# Patient Record
Sex: FEMALE | Race: WHITE | Employment: PART TIME | URBAN - METROPOLITAN AREA
[De-identification: names, ages, dates, MRNs, and addresses within clinical notes are randomized per-mention and may not be internally consistent; named-entity substitution may affect disease eponyms.]

---
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
## 2019-10-03 ENCOUNTER — HEALTH MAINTENANCE LETTER (OUTPATIENT)
Age: 19
End: 2019-10-03

## 2019-12-30 ENCOUNTER — TRANSFERRED RECORDS (OUTPATIENT)
Dept: FAMILY MEDICINE | Facility: CLINIC | Age: 19
End: 2019-12-30

## 2020-11-07 ENCOUNTER — HEALTH MAINTENANCE LETTER (OUTPATIENT)
Age: 20
End: 2020-11-07

## 2021-06-28 ENCOUNTER — TELEPHONE (OUTPATIENT)
Dept: FAMILY MEDICINE | Facility: CLINIC | Age: 21
End: 2021-06-28

## 2021-06-28 NOTE — TELEPHONE ENCOUNTER
Medical records request from [facility] [employer]. No records sent patient was last seen in  2018. Informed [employer] in the regards of records.

## 2021-07-13 ENCOUNTER — OFFICE VISIT (OUTPATIENT)
Dept: FAMILY MEDICINE | Facility: CLINIC | Age: 21
End: 2021-07-13

## 2021-07-13 VITALS
TEMPERATURE: 97.2 F | HEART RATE: 90 BPM | WEIGHT: 245 LBS | OXYGEN SATURATION: 99 % | HEIGHT: 65 IN | BODY MASS INDEX: 40.82 KG/M2 | DIASTOLIC BLOOD PRESSURE: 72 MMHG | SYSTOLIC BLOOD PRESSURE: 120 MMHG

## 2021-07-13 DIAGNOSIS — R63.0 DECREASE IN APPETITE: ICD-10-CM

## 2021-07-13 DIAGNOSIS — E66.01 MORBID OBESITY (H): ICD-10-CM

## 2021-07-13 DIAGNOSIS — F41.1 GENERALIZED ANXIETY DISORDER: ICD-10-CM

## 2021-07-13 DIAGNOSIS — F33.41 RECURRENT MAJOR DEPRESSIVE DISORDER, IN PARTIAL REMISSION (H): ICD-10-CM

## 2021-07-13 DIAGNOSIS — R11.2 NON-INTRACTABLE VOMITING WITH NAUSEA, UNSPECIFIED VOMITING TYPE: Primary | ICD-10-CM

## 2021-07-13 DIAGNOSIS — F60.3 BORDERLINE PERSONALITY DISORDER (H): ICD-10-CM

## 2021-07-13 DIAGNOSIS — Z86.59 HISTORY OF EATING DISORDER: ICD-10-CM

## 2021-07-13 LAB
ALBUMIN SERPL-MCNC: 4.8 G/DL (ref 3.6–5.1)
ALBUMIN/GLOB SERPL: 2.3 {RATIO} (ref 1–2.5)
ALP SERPL-CCNC: 73 U/L (ref 33–130)
ALT 1742-6: 9 U/L (ref 0–32)
AST 1920-8: 8 U/L (ref 0–35)
BILIRUB SERPL-MCNC: 0.4 MG/DL (ref 0.2–1.2)
BUN SERPL-MCNC: 8 MG/DL (ref 7–25)
BUN/CREATININE RATIO: 10.7 (ref 6–22)
CALCIUM SERPL-MCNC: 9.6 MG/DL (ref 8.6–10.3)
CHLORIDE SERPLBLD-SCNC: 106.5 MMOL/L (ref 98–110)
CO2 SERPL-SCNC: 25.8 MMOL/L (ref 20–32)
CREAT SERPL-MCNC: 0.75 MG/DL (ref 0.6–1.3)
ERYTHROCYTE [DISTWIDTH] IN BLOOD BY AUTOMATED COUNT: 12.8 %
GLOBULIN, CALCULATED - QUEST: 2.1 (ref 1.9–3.7)
GLUCOSE SERPL-MCNC: 100 MG/DL (ref 60–99)
HCT VFR BLD AUTO: 45.3 % (ref 35–47)
HEMOGLOBIN: 14.2 G/DL (ref 11.7–15.7)
MCH RBC QN AUTO: 29.3 PG (ref 26–33)
MCHC RBC AUTO-ENTMCNC: 31.3 G/DL (ref 31–36)
MCV RBC AUTO: 93.4 FL (ref 78–100)
PLATELET COUNT - QUEST: 344 10^9/L (ref 150–375)
POTASSIUM SERPL-SCNC: 4.61 MMOL/L (ref 3.5–5.3)
PROT SERPL-MCNC: 6.9 G/DL (ref 6.1–8.1)
RBC # BLD AUTO: 4.85 10*12/L (ref 3.8–5.2)
SODIUM SERPL-SCNC: 141 MMOL/L (ref 135–146)
WBC # BLD AUTO: 6.8 10*9/L (ref 4–11)

## 2021-07-13 PROCEDURE — 36415 COLL VENOUS BLD VENIPUNCTURE: CPT | Performed by: PHYSICIAN ASSISTANT

## 2021-07-13 PROCEDURE — 85027 COMPLETE CBC AUTOMATED: CPT | Performed by: PHYSICIAN ASSISTANT

## 2021-07-13 PROCEDURE — 80053 COMPREHEN METABOLIC PANEL: CPT | Performed by: PHYSICIAN ASSISTANT

## 2021-07-13 PROCEDURE — 99213 OFFICE O/P EST LOW 20 MIN: CPT | Performed by: PHYSICIAN ASSISTANT

## 2021-07-13 PROCEDURE — 84443 ASSAY THYROID STIM HORMONE: CPT | Mod: 90 | Performed by: PHYSICIAN ASSISTANT

## 2021-07-13 RX ORDER — VENLAFAXINE HYDROCHLORIDE 75 MG/1
75 CAPSULE, EXTENDED RELEASE ORAL DAILY
COMMUNITY
Start: 2021-06-10

## 2021-07-13 RX ORDER — VENLAFAXINE HYDROCHLORIDE 150 MG/1
150 CAPSULE, EXTENDED RELEASE ORAL DAILY
COMMUNITY
Start: 2021-07-09

## 2021-07-13 ASSESSMENT — ANXIETY QUESTIONNAIRES
5. BEING SO RESTLESS THAT IT IS HARD TO SIT STILL: SEVERAL DAYS
GAD7 TOTAL SCORE: 15
3. WORRYING TOO MUCH ABOUT DIFFERENT THINGS: MORE THAN HALF THE DAYS
IF YOU CHECKED OFF ANY PROBLEMS ON THIS QUESTIONNAIRE, HOW DIFFICULT HAVE THESE PROBLEMS MADE IT FOR YOU TO DO YOUR WORK, TAKE CARE OF THINGS AT HOME, OR GET ALONG WITH OTHER PEOPLE: VERY DIFFICULT
1. FEELING NERVOUS, ANXIOUS, OR ON EDGE: NEARLY EVERY DAY
2. NOT BEING ABLE TO STOP OR CONTROL WORRYING: MORE THAN HALF THE DAYS
7. FEELING AFRAID AS IF SOMETHING AWFUL MIGHT HAPPEN: SEVERAL DAYS
6. BECOMING EASILY ANNOYED OR IRRITABLE: NEARLY EVERY DAY

## 2021-07-13 ASSESSMENT — MIFFLIN-ST. JEOR: SCORE: 1872.19

## 2021-07-13 ASSESSMENT — PATIENT HEALTH QUESTIONNAIRE - PHQ9
5. POOR APPETITE OR OVEREATING: NEARLY EVERY DAY
SUM OF ALL RESPONSES TO PHQ QUESTIONS 1-9: 13

## 2021-07-13 NOTE — NURSING NOTE
Chief Complaint   Patient presents with     Nausea     nausea issues for 3-4 months and worse the past few weeks. Irratibilty recently due to work changes. Wants to check for diabetes.       Pre-visit Screening:  Immunizations:  up to date  Colonoscopy:  NA  Mammogram: NA  Asthma Action Test/Plan:  NA  PHQ9:  Done today  GAD7:  Done today  Questioned patient about current smoking habits Pt. smokes 5-6 cigerettes a day  Ok to leave detailed message on voice mail for today's visit only Yes, phone # cell

## 2021-07-13 NOTE — LETTER
[facility]  [address], [address]  [place]  [address]    2021        Mervat GARCIA Ivory  [address]  [facility] [address]  : 2000            To Whom It Concern:    Due to medical condition, please excuse Mervat from work responsibilities 2021. She may return to work 2021.     Due to medical condition, it is necessary for Mervat to be allowed a full 30 minute break for every 6 hours worked, so please make this accommodation accordingly. This is an indefinite accommodation as her condition is chronic.     If you have any further questions or problems, please contact our office at [phone] or by fax [phone].          Gladys Kumar PA-C

## 2021-07-13 NOTE — PROGRESS NOTES
"CC: Nausea, irritability    History:  Mervat is here today with intermittent nausea that started 4-5 months ago. 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  Now more recently people have been quitting from the kitchen that she works in, going on vacation, leaving her with more hours than she'd like, and very busy when she is working. Working 45 hours weekly, not getting true break in 8 hour shifts- maybe gets a 10 minute break.  Experiencing nausea, low appetite, which both have been getting more constant. Has thrown up at times. Can happen with eating or without. Denies any significant abdominal pain. Is having regular BMs. Almost always happening on work days. Very rarely at home. Is currently feeling better after having 3 days off. Is supposed to return to work tonight. Does admit to using recreational marijuana approximately every other days, but this has been stable for 5 years.     Patient has known anxiety, depression, history of disordered eating, borderline personality disorder that she is seen at [facility] approximately every 2-3 months with last appt was 1 month ago. Finished DBT 1 year ago, but this was helpful. No recent medication changes.     PMH, MEDICATIONS, ALLERGIES, SOCIAL AND FAMILY HISTORY in [facility] and reviewed by me personally.    ROS negative other than the symptoms noted above in the HPI.      Examination   /72 (BP Location: Right arm, Patient Position: Sitting, Cuff Size: Adult Large)   Pulse 90   Temp 97.2  F (36.2  C) (Temporal)   Ht 1.651 m (5' 5\")   Wt 111.1 kg (245 lb)   LMP 06/27/2021   SpO2 99%   BMI 40.77 kg/m       Constitutional: Sitting comfortably, in no acute distress. Vital signs noted  Mouth and throat: without erythema or lesions of the mucosa  Neck:  no adenopathy, trachea midline and normal to palpation, thyroid normal to palpation  Cardiovascular:  regular rate and rhythm, no murmurs, clicks, or " gallops  Respiratory:  normal respiratory rate and rhythm, lungs clear to auscultation  Abdomen: Abdomen soft, non-tender. BS normal. No masses, organomegaly  SKIN: No jaundice/pallor/rash.   Psychiatric: mentation appears normal and affect normal/bright        A/P    ICD-10-CM    1. Non-intractable vomiting with nausea, unspecified vomiting type  R11.2 VENOUS COLLECTION     TSH WITH FREE T4 REFLEX ([facility])     Hemogram Platelet ([facility])     Comprehensive Metobolic Panel ([facility])   2. Decrease in appetite  R63.0 VENOUS COLLECTION     TSH WITH FREE T4 REFLEX ([facility])     Hemogram Platelet ([facility])     Comprehensive Metobolic Panel ([facility])   3. History of eating disorder  Z86.59     Psychiatric care from [facility]   4. Recurrent major depressive disorder, in partial remission (H)  F33.41     Psychiatric care from [facility]   5. Generalized anxiety disorder  F41.1     Psychiatric care from [facility]   6. Borderline personality disorder (H)  F60.3     Psychiatric care from [facility]   7. Morbid obesity (H)  E66.01        DISCUSSION:  Suspect symptoms are mostly due to increased anxiety both anticipating work shifts, as well as during busy work shifts. Wrote letter for her to return to work tomorrow, but also asked for accomodation for true 30 minute break for every 6 hours that she works. Will check TSH, CBC, and CMP, and contact her with results via [facility] when available. If all normal, and work accomodation isn't helping, may need to seek leave from work or alternative employment. Medication adjustment through psychiatrist would be an option as well.     follow up visit: As needed    Gladys uKmar PA-C  [facility]

## 2021-07-14 LAB — TSH SERPL-ACNC: 1.39 MIU/L

## 2021-07-14 ASSESSMENT — ANXIETY QUESTIONNAIRES: GAD7 TOTAL SCORE: 15

## 2021-09-05 ENCOUNTER — HEALTH MAINTENANCE LETTER (OUTPATIENT)
Age: 21
End: 2021-09-05

## 2021-12-26 ENCOUNTER — HEALTH MAINTENANCE LETTER (OUTPATIENT)
Age: 21
End: 2021-12-26

## 2022-06-24 ENCOUNTER — OFFICE VISIT (OUTPATIENT)
Dept: FAMILY MEDICINE | Facility: CLINIC | Age: 22
End: 2022-06-24

## 2022-06-24 VITALS
OXYGEN SATURATION: 98 % | DIASTOLIC BLOOD PRESSURE: 78 MMHG | HEART RATE: 104 BPM | TEMPERATURE: 98.3 F | WEIGHT: 290 LBS | BODY MASS INDEX: 48.32 KG/M2 | HEIGHT: 65 IN | SYSTOLIC BLOOD PRESSURE: 124 MMHG

## 2022-06-24 DIAGNOSIS — G89.29 CHRONIC RIGHT HIP PAIN: ICD-10-CM

## 2022-06-24 DIAGNOSIS — M25.551 CHRONIC RIGHT HIP PAIN: ICD-10-CM

## 2022-06-24 DIAGNOSIS — M70.61 TROCHANTERIC BURSITIS OF RIGHT HIP: Primary | ICD-10-CM

## 2022-06-24 PROCEDURE — 99213 OFFICE O/P EST LOW 20 MIN: CPT | Mod: 25 | Performed by: PHYSICIAN ASSISTANT

## 2022-06-24 PROCEDURE — 73502 X-RAY EXAM HIP UNI 2-3 VIEWS: CPT | Mod: RT | Performed by: PHYSICIAN ASSISTANT

## 2022-06-24 PROCEDURE — 90714 TD VACC NO PRESV 7 YRS+ IM: CPT | Performed by: PHYSICIAN ASSISTANT

## 2022-06-24 PROCEDURE — 90471 IMMUNIZATION ADMIN: CPT | Performed by: PHYSICIAN ASSISTANT

## 2022-06-24 NOTE — PROGRESS NOTES
"Assessment & Plan     1. Trochanteric bursitis of right hip    2. Chronic right hip pain      Xray reading pending  Will call if concerns for AVM    Exercises for hip bursitis provided to be completed 1-2 times daily, as tolerated.   Will refer to PT if not improving.      AFTAB Thomas  [facility]    Subjective     Nursing Notes:   Maribel Zepeda CMA  6/24/2022 12:47 PM  Signed  Chief Complaint   Patient presents with     Hip Pain     Right hip pain on and off for years, in january while at work went to bend down to pick something up and she heard a pop in her hip, gets very stiff/ swollen after standing, a few days ago went to bend down and the pain become more intense      Pre-visit Screening:  Immunizations:  not up to date - tetanus due  Colonoscopy:  NA  Mammogram: NA  Asthma Action Test/Plan:  NA  PHQ9:  NA  GAD7:  NA  Questioned patient about current smoking habits Pt. Smokes.  Ok to leave detailed message on voice mail for today's visit only Yes, phone # [phone]              Mervat Dodson is a 22 year old female who presents to clinic today for the following health issues:     HPI       Joint Pain    Onset: in Jan was bending down, felt popping pain sensation -pain lasted for a week, again last week    Description:   Location: right hip  Character: sharp stabbing pain in different location then where the dull pain - soreness on the right side     Intensity:  2/10    Progression of Symptoms: better    Accompanying Signs & Symptoms:  Other symptoms: none    History:   Previous similar pain: YES- in Jan and last week       Precipitating factors:   Trauma or overuse: no    Alleviating factors:  Improved by: lifting up side while walking  Always feels like right hip is \"out of place\".     Therapies Tried and outcome: rest,  Naproxen.       Again last week squatting down to  something          Objective    /78 (BP Location: Left arm, Patient Position: Sitting, " "Cuff Size: Adult Large)   Pulse 104   Temp 98.3  F (36.8  C) (Temporal)   Ht 1.651 m (5' 5\")   Wt 131.5 kg (290 lb)   LMP 06/10/2022   SpO2 98%   BMI 48.26 kg/m    Body mass index is 48.26 kg/m .  Physical Exam       Palpation: Tender:   Right trochanteric bursa  Non-tender:  right greater trochanter, right gluteus medius, right ASIS, right iliac crest, right proximal hamstring attachment  Range of Motion:  Right Hip  All slightly limited due to pain  Strength:  full strength  Special tests:  KEY +    Xray: questionable flattening of head of femur  "

## 2022-06-24 NOTE — NURSING NOTE
Chief Complaint   Patient presents with     Hip Pain     Right hip pain on and off for years, in january while at work went to bend down to pick something up and she heard a pop in her hip, gets very stiff/ swollen after standing, a few days ago went to bend down and the pain become more intense      Pre-visit Screening:  Immunizations:  not up to date - tetanus due  Colonoscopy:  NA  Mammogram: NA  Asthma Action Test/Plan:  NA  PHQ9:  NA  GAD7:  NA  Questioned patient about current smoking habits Pt. Smokes.  Ok to leave detailed message on voice mail for today's visit only Yes, phone # [phone]

## 2022-10-23 ENCOUNTER — HEALTH MAINTENANCE LETTER (OUTPATIENT)
Age: 22
End: 2022-10-23

## 2022-12-08 ENCOUNTER — OFFICE VISIT (OUTPATIENT)
Dept: FAMILY MEDICINE | Facility: CLINIC | Age: 22
End: 2022-12-08

## 2022-12-08 VITALS
WEIGHT: 293 LBS | OXYGEN SATURATION: 99 % | HEIGHT: 65 IN | SYSTOLIC BLOOD PRESSURE: 122 MMHG | DIASTOLIC BLOOD PRESSURE: 76 MMHG | HEART RATE: 87 BPM | BODY MASS INDEX: 48.82 KG/M2 | TEMPERATURE: 97.9 F

## 2022-12-08 DIAGNOSIS — H69.92 DYSFUNCTION OF LEFT EUSTACHIAN TUBE: ICD-10-CM

## 2022-12-08 DIAGNOSIS — J02.9 VIRAL PHARYNGITIS: Primary | ICD-10-CM

## 2022-12-08 PROCEDURE — 99213 OFFICE O/P EST LOW 20 MIN: CPT | Performed by: PHYSICIAN ASSISTANT

## 2022-12-08 NOTE — PROGRESS NOTES
"CC:  Sore throat    History:  Mervat developed a sore/scratchy throat 3-4 days ago on Sunday evening. Seemed to get worse yesterday where it feels tight/swollen. Also developed some left ear aching yesterday as well. Minimal body aches and some diarrhea. No fever, sweats, chills, sinus congestion, facial pain, headache, SOB, wheezing.     Took negative Covid-19 test at home and was negative. Had tonsillectomy at age 15. Also had ear tubes.    Has not taken any medication at home, but has been rested, eating/fluids.    PMH, MEDICATIONS, ALLERGIES, SOCIAL AND FAMILY HISTORY in [facility] and reviewed by me personally.    ROS negative other than the symptoms noted above in the HPI.      Examination   /76 (BP Location: Left arm, Patient Position: Sitting, Cuff Size: Adult Large)   Pulse 87   Temp 97.9  F (36.6  C) (Temporal)   Ht 1.651 m (5' 5\")   Wt 134.7 kg (297 lb)   SpO2 99%   BMI 49.42 kg/m       Constitutional: Sitting comfortably, in no acute distress. Vital signs noted  Eyes: pupils equal round reactive to light and accomodation, extra ocular movements intact  Ears: external canals and TMs free of abnormalities  Nose: patent, without mucosal abnormalities  Mouth and throat: without erythema or lesions of the mucosa  Neck:  no adenopathy, trachea midline and normal to palpation, thyroid normal to palpation  Cardiovascular:  regular rate and rhythm, no murmurs, clicks, or gallops  Respiratory:  normal respiratory rate and rhythm, lungs clear to auscultation  SKIN: No jaundice/pallor/rash.   Psychiatric: mentation appears normal and affect normal/bright      A/P    ICD-10-CM    1. Viral pharyngitis  J02.9       2. Dysfunction of left eustachian tube  H69.82           DISCUSSION:    Consistent with viral pharyngitis. Should resolve without significant intervention. Recommended manage with conservative therapies- alternate ibuprofen (with food) and Tylenol. Can soothe with salt water gurgles. Cough " suppressant/decongestant as needed. Contact me in 1 week if not significantly better, or sooner with worsening.      Ear symptoms and exam consistent with eustachian tube dysfunction. Explained to patient that timeline to complete resolution can be variable from days to weeks, but no significant intervention is usually warranted/necessary. Advised trial on decongestant. Also should try gentle Valsalva maneuvers to promote drainage.     follow up visit:  1 week    Gladys Kumar PA-C  [facility]

## 2022-12-08 NOTE — LETTER
[facility]  [address], [address]  [place]  [address]    December 8, 2022        Mervat GARCIA [facility]  [address] [facility] [address]              To Whom It May Concern:    Mervat is patient at our clinic seen 12/8/2022. Due to non-covid 19 illness, please excuse Mervat from work responsibilities 12/8/2022. She can return to work to without restriction 12/9/2022.      If you have any further questions or problems, please contact our office at [phone].          Gladys Kumar PA-C

## 2022-12-08 NOTE — NURSING NOTE
Chief Complaint   Patient presents with     Throat Pain     Pt felt a scratchy throat 3 days ago, yesterday it began to become more uncomfortable/tight, swollen  Neg covid test this morning  Had a tonsillectomy when she was 15  Inner left ear is starting to ache         Pre-visit Screening:  Immunizations:  up to date  Colonoscopy:  NA  Mammogram: NA  Asthma Action Test/Plan:  NA  PHQ9:  NA  GAD7:  NA  Questioned patient about current smoking habits Pt. Smokes 7 cigs per day  Ok to leave detailed message on voice mail for today's visit only Yes, phone # [phone]

## 2023-04-02 ENCOUNTER — HEALTH MAINTENANCE LETTER (OUTPATIENT)
Age: 23
End: 2023-04-02

## 2024-06-08 ENCOUNTER — HEALTH MAINTENANCE LETTER (OUTPATIENT)
Age: 24
End: 2024-06-08

## 2024-06-17 PROBLEM — Z76.89 HEALTH CARE HOME: Status: RESOLVED | Noted: 2018-08-21 | Resolved: 2024-06-17

## 2025-06-15 ENCOUNTER — HEALTH MAINTENANCE LETTER (OUTPATIENT)
Age: 25
End: 2025-06-15